# Patient Record
Sex: MALE | Race: OTHER | Employment: FULL TIME | URBAN - METROPOLITAN AREA
[De-identification: names, ages, dates, MRNs, and addresses within clinical notes are randomized per-mention and may not be internally consistent; named-entity substitution may affect disease eponyms.]

---

## 2022-01-05 PROCEDURE — 74011250637 HC RX REV CODE- 250/637: Performed by: STUDENT IN AN ORGANIZED HEALTH CARE EDUCATION/TRAINING PROGRAM

## 2022-01-05 PROCEDURE — 96375 TX/PRO/DX INJ NEW DRUG ADDON: CPT

## 2022-01-05 PROCEDURE — 99283 EMERGENCY DEPT VISIT LOW MDM: CPT

## 2022-01-05 PROCEDURE — 96374 THER/PROPH/DIAG INJ IV PUSH: CPT

## 2022-01-05 RX ORDER — ACETAMINOPHEN 500 MG
1000 TABLET ORAL
Status: COMPLETED | OUTPATIENT
Start: 2022-01-05 | End: 2022-01-05

## 2022-01-05 RX ADMIN — ACETAMINOPHEN 1000 MG: 500 TABLET ORAL at 22:02

## 2022-01-06 ENCOUNTER — HOSPITAL ENCOUNTER (EMERGENCY)
Age: 37
Discharge: HOME | End: 2022-01-06
Attending: STUDENT IN AN ORGANIZED HEALTH CARE EDUCATION/TRAINING PROGRAM

## 2022-01-06 VITALS
RESPIRATION RATE: 16 BRPM | WEIGHT: 165.34 LBS | TEMPERATURE: 97.9 F | DIASTOLIC BLOOD PRESSURE: 77 MMHG | OXYGEN SATURATION: 98 % | HEART RATE: 71 BPM | HEIGHT: 65 IN | BODY MASS INDEX: 27.55 KG/M2 | SYSTOLIC BLOOD PRESSURE: 124 MMHG

## 2022-01-06 DIAGNOSIS — R51.9 ACUTE NONINTRACTABLE HEADACHE, UNSPECIFIED HEADACHE TYPE: Primary | ICD-10-CM

## 2022-01-06 LAB — SARS-COV-2, COV2: NORMAL

## 2022-01-06 PROCEDURE — U0005 INFEC AGEN DETEC AMPLI PROBE: HCPCS

## 2022-01-06 PROCEDURE — 74011250636 HC RX REV CODE- 250/636: Performed by: STUDENT IN AN ORGANIZED HEALTH CARE EDUCATION/TRAINING PROGRAM

## 2022-01-06 RX ORDER — DIPHENHYDRAMINE HYDROCHLORIDE 50 MG/ML
50 INJECTION, SOLUTION INTRAMUSCULAR; INTRAVENOUS ONCE
Status: COMPLETED | OUTPATIENT
Start: 2022-01-06 | End: 2022-01-06

## 2022-01-06 RX ORDER — KETOROLAC TROMETHAMINE 30 MG/ML
15 INJECTION, SOLUTION INTRAMUSCULAR; INTRAVENOUS
Status: COMPLETED | OUTPATIENT
Start: 2022-01-06 | End: 2022-01-06

## 2022-01-06 RX ORDER — PROCHLORPERAZINE EDISYLATE 5 MG/ML
10 INJECTION INTRAMUSCULAR; INTRAVENOUS
Status: COMPLETED | OUTPATIENT
Start: 2022-01-06 | End: 2022-01-06

## 2022-01-06 RX ADMIN — KETOROLAC TROMETHAMINE 15 MG: 30 INJECTION, SOLUTION INTRAMUSCULAR at 04:40

## 2022-01-06 RX ADMIN — DIPHENHYDRAMINE HYDROCHLORIDE 50 MG: 50 INJECTION INTRAMUSCULAR; INTRAVENOUS at 04:36

## 2022-01-06 RX ADMIN — PROCHLORPERAZINE EDISYLATE 10 MG: 5 INJECTION INTRAMUSCULAR; INTRAVENOUS at 04:37

## 2022-01-06 NOTE — ED PROVIDER NOTES
EMERGENCY DEPARTMENT HISTORY AND PHYSICAL EXAM      Date: 1/6/2022  Patient Name: Julianne Lin      History of Presenting Illness     Chief Complaint   Patient presents with    Migraine    Dizziness       History Provided By: Patient and  ID [id number]    HPI: Julianne Lin, 39 y.o. male with chronic PMH noticed to the ED for evaluation of headache. Patient reports that has been having headache and difficulty sleeping over the last 3 days. Headache is generalized, nothing specific makes it better or worse with no associate additional symptoms. Patient is denying fever, neck pain, photophobia, photophobia, visual changes, difficulty speaking, numbness, weakness and difficulty moving his neck. Patient is concerned that he tested positive for hepatitis in [place] and would like to be tested and that is been bothering him. This is the reason that has been having headaches. There are no other complaints, changes, or physical findings at this time. PCP: None        Past History     Past Medical History:  No past medical history on file. Past Surgical History:  Past Surgical History:   Procedure Laterality Date    HX HERNIA REPAIR         Family History:  No family history on file. Social History:  Social History     Tobacco Use    Smoking status: Current Every Day Smoker    Smokeless tobacco: Never Used   Substance Use Topics    Alcohol use: Yes     Alcohol/week: 1.0 standard drink     Types: 1 Glasses of wine per week     Comment: occ    Drug use: Never       Allergies:  No Known Allergies      Review of Systems     Review of Systems   Constitutional: Negative for activity change and fever. HENT: Negative for dental problem, ear pain, sinus pressure and sinus pain. Eyes: Negative for photophobia, pain and visual disturbance. Respiratory: Negative for cough and shortness of breath. Cardiovascular: Negative for chest pain and leg swelling.    Gastrointestinal: Negative for nausea and vomiting. Endocrine: Negative for polydipsia and polyuria. Musculoskeletal: Negative for arthralgias, gait problem, neck pain and neck stiffness. Neurological: Positive for headaches. Negative for dizziness, seizures and weakness. Psychiatric/Behavioral: Negative for confusion and hallucinations. Physical Exam     Physical Exam  Constitutional:       Appearance: Normal appearance. He is not ill-appearing. HENT:      Head: Normocephalic and atraumatic. Eyes:      Extraocular Movements: Extraocular movements intact. Conjunctiva/sclera: Conjunctivae normal.   Cardiovascular:      Rate and Rhythm: Normal rate and regular rhythm. Pulmonary:      Effort: Pulmonary effort is normal.      Breath sounds: Normal breath sounds. Abdominal:      General: Abdomen is flat. Palpations: Abdomen is soft. Tenderness: There is no abdominal tenderness. Musculoskeletal:         General: No tenderness or deformity. Cervical back: Normal range of motion and neck supple. Skin:     General: Skin is warm and dry. Neurological:      Mental Status: He is alert and oriented to person, place, and time. Cranial Nerves: No cranial nerve deficit. Sensory: No sensory deficit. Motor: No weakness. Gait: Gait normal.         Lab and Diagnostic Study Results     Labs -   No results found for this or any previous visit (from the past 12 hour(s)). Radiologic Studies -   [unfilled]  CT Results  (Last 48 hours)    None        CXR Results  (Last 48 hours)    None          Medical Decision Making and ED Course   - I am the first and primary provider for this patient AND AM THE PRIMARY PROVIDER OF RECORD. - I reviewed the vital signs, available nursing notes, past medical history, past surgical history, family history and social history. - Initial assessment performed.  The patients presenting problems have been discussed, and the staff are in agreement with the care plan formulated and outlined with them. I have encouraged them to ask questions as they arise throughout their visit. Vital Signs-Reviewed the patient's vital signs. No data found. Records Reviewed: Nursing Notes    Provider Notes (Medical Decision Making):     Patient presents with what appears to be a recurrent primary headache. The patient had significant relief with Compazine, Benadryl and Tylenol in the emergency department and required no additional treatment. Patient's historical features are not consistent with the more malignant etiologies such as meningitis or intracranial hemorrhage. Patient has no meningeal signs on physical exam and the patient's symptoms are consistent with previous headaches. Patient is  Neurologically intact. The patient has no history of trauma. I do not believe a lumbar puncture is warranted at this time on account of the patient's historical features and current symptoms. Patient denies that this is the worst headache of life. The patient denies sudden or thunderclap onset. After completion of workup and discussion of results and diagnoses with the patient, all the patient's questions were answered. If required, all follow up appointments and treatments were discussed and explained. The patient sounded understanding and agrees with the plan. The patient understands that at this time there is no evidence for a more malignant underlying process, but the patient also understands that early in the process of an illness, an emergency department workup can be falsely reassuring. Routine discharge counseling was given to the patient and the patient understands that worsening, changing or persistent symptoms should prompt an immediate call or follow up with their primary physician or the emergency department. The importance of appropriate follow up was also discussed with the patient. More extensive discharge instructions were given in the patient's discharge paperwork.     He is given resources to f/up on his concerns for hepatitis and out patient testing. Disposition     Disposition: Condition resolved  DC- Adult Discharges: All of the diagnostic tests were reviewed and questions answered. Diagnosis, care plan and treatment options were discussed. The patient understands the instructions and will follow up as directed. The patients results have been reviewed with them. They have been counseled regarding their diagnosis. The patient verbally convey understanding and agreement of the signs, symptoms, diagnosis, treatment and prognosis and additionally agrees to follow up as recommended with their PCP in 24 - 48 hours. They also agree with the care-plan and convey that all of their questions have been answered. I have also put together some discharge instructions for them that include: 1) educational information regarding their diagnosis, 2) how to care for their diagnosis at home, as well a 3) list of reasons why they would want to return to the ED prior to their follow-up appointment, should their condition change. Discharged      DISCHARGE PLAN:  1. There are no discharge medications for this patient. 2.   Follow-up Information     Follow up With Specialties Details Why [facility] EMERGENCY DEPT Emergency Medicine Go to  As needed, If symptoms worsen [address]  Schedule an appointment as soon as possible for a visit on 1/10/2022 To establish primary care [address] [place] [address]  [phone]        3. Return to ED if worse   4. There are no discharge medications for this patient. Diagnosis     Clinical Impression:   1. Acute nonintractable headache, unspecified headache type        Attestations: Keith Steinberg MD    Please note that this dictation was completed with [employer], the computer voice recognition software.   Quite often unanticipated grammatical, syntax, homophones, and other interpretive errors are inadvertently transcribed by the computer software. Please disregard these errors. Please excuse any errors that have escaped final proofreading. Thank you.

## 2022-01-07 LAB
SARS-COV-2, XPLCVT: NOT DETECTED
SOURCE, COVRS: NORMAL